# Patient Record
Sex: FEMALE | Race: WHITE | URBAN - METROPOLITAN AREA
[De-identification: names, ages, dates, MRNs, and addresses within clinical notes are randomized per-mention and may not be internally consistent; named-entity substitution may affect disease eponyms.]

---

## 2017-02-27 RX ORDER — ESTRADIOL 2 MG/1
RING VAGINAL
Qty: 1 EACH | Refills: 3 | Status: SHIPPED | OUTPATIENT
Start: 2017-02-27 | End: 2018-10-15

## 2018-10-02 ENCOUNTER — OFFICE VISIT (OUTPATIENT)
Dept: OBGYN CLINIC | Facility: CLINIC | Age: 53
End: 2018-10-02
Payer: COMMERCIAL

## 2018-10-02 VITALS
HEIGHT: 67 IN | DIASTOLIC BLOOD PRESSURE: 68 MMHG | BODY MASS INDEX: 24.23 KG/M2 | WEIGHT: 154.38 LBS | SYSTOLIC BLOOD PRESSURE: 118 MMHG

## 2018-10-02 DIAGNOSIS — R10.2 VAGINAL PAIN: ICD-10-CM

## 2018-10-02 DIAGNOSIS — N94.10 DYSPAREUNIA IN FEMALE: ICD-10-CM

## 2018-10-02 DIAGNOSIS — N95.2 ATROPHIC VAGINITIS: Primary | ICD-10-CM

## 2018-10-02 PROBLEM — D12.4 ADENOMATOUS POLYP OF DESCENDING COLON: Status: ACTIVE | Noted: 2018-05-11

## 2018-10-02 PROBLEM — F32.A DEPRESSION: Status: ACTIVE | Noted: 2018-10-02

## 2018-10-02 PROBLEM — G51.0 FACIAL PARESIS: Status: ACTIVE | Noted: 2018-07-20

## 2018-10-02 PROBLEM — Q51.28 UTERUS DIDELPHUS: Status: ACTIVE | Noted: 2018-10-02

## 2018-10-02 PROCEDURE — 99213 OFFICE O/P EST LOW 20 MIN: CPT | Performed by: ADVANCED PRACTICE MIDWIFE

## 2018-10-02 RX ORDER — BUPROPION HYDROCHLORIDE 150 MG/1
TABLET ORAL
Refills: 98 | COMMUNITY
Start: 2018-09-07

## 2018-10-02 RX ORDER — CITALOPRAM 20 MG/1
TABLET ORAL
COMMUNITY
Start: 2018-01-21

## 2018-10-02 RX ORDER — FLUTICASONE PROPIONATE 50 MCG
SPRAY, SUSPENSION (ML) NASAL
COMMUNITY
Start: 2018-01-21

## 2018-10-02 RX ORDER — CITALOPRAM 20 MG/1
TABLET ORAL
Refills: 98 | COMMUNITY
Start: 2018-09-10 | End: 2018-10-02

## 2018-10-15 ENCOUNTER — TELEPHONE (OUTPATIENT)
Dept: OBGYN CLINIC | Facility: CLINIC | Age: 53
End: 2018-10-15

## 2018-10-15 NOTE — TELEPHONE ENCOUNTER
Per pharmacist states medication  Prasterone is not covered by pt's ins and needs a PA or needs a completely different medication.    Pls advise

## 2018-10-15 NOTE — TELEPHONE ENCOUNTER
Pharm calling to inform that the Prasterone/Intrarosa 6.5mg cream Product service is not covered by pts insur. ,

## (undated) NOTE — LETTER
10/4/2018              [address] [place] [address]         Dear Aidan Hassan,    It was a pleasure to see you. Your vaginal culture was negative for bacterial vaginosis and trichomonas. We are still awaiting yeast result.   The